# Patient Record
(demographics unavailable — no encounter records)

---

## 2024-12-04 NOTE — ASSESSMENT
[FreeTextEntry1] : Dyspepsia: The patient complains of dyspeptic symptoms.  The patient was advised to continue to abide by an anti-gas (low FOD-MAP) diet.  The patient was previously given a pamphlet for anti-gas (low FOD-MAP).  The patient and I reviewed the anti-gas (low FOD-MAP) diet at length again. The patient is to continue on a trial of Simethicone one tablet 4 times a day p.r.n. abdominal pain and gas. GERD: The patient was advised to avoid late-night meals and dietary indiscretions.  The patient was advised to avoid fried and fatty foods.  The patient was advised to abide by an anti-GERD diet. The patient was given a pamphlet for anti-GERD.  The patient and I reviewed the anti-GERD diet at length. I recommend a trial of Pantoprazole 40 mg once a day x 3 months for the symptoms. Alternating Diarrhea/Constipation: The patient complains of alternating diarrhea/constipation.  I recommend a low residue diet. The patient is to avoid fiber supplementation. The patient is to consider starting a trial of a probiotic such as Align once a day.   I recommend sending stool studies for C+S, O+P x3, and C. difficile to assess for an infectious etiology of the diarrhea.  The symptoms are worse after meals.  The patient has a history of cholecystectomy.  I recommend a trial of cholestyramine one packet once a day for possible bile induced diarrhea. If the symptoms persist, the patient may require a colonoscopy to assess for colitis versus other causes.  The patient was told of the risks and benefits of the procedure.  The patient was told of the risks of perforation, emergency surgery, bleeding, infections and missed lesions.  The patient agreed and will follow-up to reassess the symptoms. Hiatal Hernia: The patient has a history of a hiatal hernia noted on prior upper endoscopy. The patient was advised to avoid late-night meals and dietary indiscretions. The patient was advised to avoid fried and fatty foods. The patient was advised to abide by an anti-GERD diet. The patient was given a pamphlet for anti-GERD. The patient and I reviewed the anti-GERD diet at length. I recommend continue on a trial of Pantoprazole 40 mg once a day for 3 months for the symptoms. Gastritis: The patient has a history of gastritis noted on prior upper endoscopy. The patient is to avoid nonsteroidal anti-inflammatory drugs and aspirin. I recommend continue on a trial of pantoprazole 40 mg once a day for 3 months for the symptoms. Gastric Polyps: The patient was found to have gastric polyps on prior upper endoscopy. The pathology revealed fundic gland polyps. The patient and I discussed the risks of fundic gland polyps. The patient was told of the possibility of increasing size and bleeding secondary to gastric polyps. The patient was advised to follow up in the office to reassess the gastric polyps. Diverticulosis: I recommend a low residue diet and avoid seeds. The patient is to avoid Metamucil or other fiber supplementation. The patient is to also consider a trial of a probiotic such as Align once a day. The patient and I discussed the potential risk of diverticulitis and diverticular bleeding secondary to diverticular disease. The patient is aware of the importance of follow-up if these complications arise. Colonic AVMs: The patient was found to have colonic AVMs on recent colonoscopy. There was no evidence of active GI bleeding from the colonic AVMs. I recommend observation at this time. If the patient has evidence of bleeding that include anemia, occult blood in stool or bright red blood per rectum, the patient may require a repeat colonoscopy and possible cauterization of the AVMs. Internal Hemorrhoids: The patient is to consider a trial of Anusol H. C. suppositories one per rectum nightly and Anusol HC2.5% cream apply to affected area twice a day p.r.n. hemorrhoidal bleeding or pain. I also recommend a trial of Calmoseptine cream apply to affected area twice a day for hemorrhoidal discomfort. I recommend Tucks pads for the hemorrhoids. I recommend Sitz baths twice a day for the hemorrhoids. I recommend avoid wearing tight underwear and use boxers. I recommend avoid touching the perineal area. The patient agreed to followup. I recommend a repeat colonoscopy in 4 years to reassess for colonic polyps unless symptomatic. The patient agreed and will follow up for the procedure. Irritable Bowel Syndrome: The patient has symptoms suggestive of irritable bowel syndrome. The patient was advised to follow a low FOD-MAP diet for the irritable bowel syndrome. I recommend a trial of Dicyclomine 10mg 3 x a day for the abdominal pain. The patient has symptoms suggestive of IBS-D. The patient previously completed a trial of Xifaxin 550 mg PO 3 times a day for two weeks with little improvement. Fatty Liver: The patient had an imaging study suggestive of fatty infiltration of the liver. The patient denies any jaundice or pruritus. The patient denies any alcohol use. The patient denies taking large doses of nonsteroidal anti-inflammatory drugs or acetaminophen. The findings are suggestive of fatty liver. The patient and I had a long discussion regarding the risks of fatty liver and possible progression to cirrhosis. The patient was told of the possible increased risk of developing liver failure, cirrhosis, ascites, GI bleeding secondary to varices, hepatic encephalopathy, bleeding tendencies and liver cancer. The patient was told of the importance of follow-up. The patient was advised to follow up every 6 months for blood work and imaging studies. The patient agreed and will follow up. The patient was advised to lose weight. I recommend a trial of vitamin E supplementation for the fatty liver. If the liver enzymes remain elevated, the patient may require a trial of Pioglitazone for the fatty liver. I recommend avoid alcohol and hepato-toxic agents. The patient was also advised to avoid NSAIDs, Acetaminophen and any other hepatotoxic drugs. The patient was also advised not to share needles, razors, scissors, nail clippers, etc.. The patient is to continue close follow-up in our office for blood work and exams. If the liver enzymes remain elevated, the patient may require a CT guided liver biopsy to assess the liver parenchyma for possible treatment. We had a long discussion regarding the risks and benefits of the procedure. The patient was told of the risks of bleeding, perforation, infections, emergency surgery and missing lesions. The patient agreed and will follow-up to reassess the symptoms. Family History of GI Malignancy: The patient has a family history of GI problems. The patient's father had stomach cancer and a paternal grandmother had colon cancer. The patient previously completed a trial of Xifaxin 550 mg TID x 2 weeks for possible post-infectious irritable bowel syndrome with little improvement. The patient is being followed by her gynecologist, Dr. Jeff Mcknight. According to the patient, the GYN exam was unremarkable. The prior stool studies for C+S, O+P x 3 and C. difficile toxin were negative. I recommend continue on the Cholestyramine 1 packet once a day for the bile induced diarrhea. Imaging Study: I recommend an imaging study to assess the symptoms. I recommend an abdominal ultrasound to assess the liver parenchyma and for liver lesions. Blood Work: I recommend blood work to assess the liver. I recommend a CBC, SMA 24, amylase, lipase, ESR, TFTs, YOLIE, rheumatoid factor, celiac sprue panel, IgA, profile for hepatitis A, B, C. ,AFP, alpha 1 anti-trypsin  antibody, ceruloplasmin level, iron, TIBC, ferritin level, AMA, anti smooth muscle antibody and PT/INR/PTT.  I also recommend obtaining the recent blood work performed by the patient's PMD. Follow-up: The patient is to follow-up in the office in 6 months to reassess the symptoms. The patient was told to call the office if any further problems.

## 2024-12-04 NOTE — HISTORY OF PRESENT ILLNESS
[de-identified] : The upper endoscopy performed at the [facility] [place] GI endoscopy suite on February 17, 2022 revealed a small hiatal hernia and mild diffuse gastritis with scattered gastric polyps. The gastric pathology performed on February 17, 2022 revealed esophageal mucosa with fragments of unremarkable squamous esophageal epithelium with a PASF stain that is negative for fungal microorganisms (esophagus), gastric antral mucosa with chemical gastritis (reactive gastropathy) with reactive foveolar hyperplasia, muscular stranding, vascular ectasias and mild chronic lymphoplasmocytic inflammatory cell infiltrate in the lamina propria that was negative for Helicobacter pylori (antrum), gastric body mucosa with chronic inactive gastritis and gastric fundic gland polyp that was negative for Helicobacter pylori (body of the stomach) and duodenal mucosa with preserved villous architecture with no parasites or increased intraepithelial lymphocytes identified (duodenum). \par   [FreeTextEntry1] : The colonoscopy to the terminal ileum performed at the [facility] [place] GI endoscopy suite on February 17, 2022 revealed severe left sided diverticulosis, ascending colon AVMs and small internal hemorrhoids. There were no polyps, masses, AVMs or colitis noted. The colonic pathology performed on February 17, 2022 revealed colonic mucosa with reactive lymphoid aggregates with no acute or chronic epithelial damage identified (cecum) and small intestinal mucosa with preserved villous architecture with no parasites or increased intraepithelial lymphocytes identified (terminal ileum). \par   [de-identified] : The abdominal ultrasound performed on July 18, 2020 revealed hepatomegaly and echogenic liver and status post cholecystectomy. The pelvic ultrasound performed on July 18, 2020 revealed a fibroid uterus.

## 2025-05-15 NOTE — HISTORY OF PRESENT ILLNESS
[FreeTextEntry1] : Pt comes for annual exam . She has no complains . needs a pap and mammogram . Not sexually active